# Patient Record
Sex: MALE | Race: WHITE | NOT HISPANIC OR LATINO | Employment: UNEMPLOYED | URBAN - NONMETROPOLITAN AREA
[De-identification: names, ages, dates, MRNs, and addresses within clinical notes are randomized per-mention and may not be internally consistent; named-entity substitution may affect disease eponyms.]

---

## 2021-03-17 ENCOUNTER — OFFICE VISIT (OUTPATIENT)
Dept: CARDIOLOGY | Facility: CLINIC | Age: 56
End: 2021-03-17

## 2021-03-17 VITALS
HEIGHT: 70 IN | SYSTOLIC BLOOD PRESSURE: 160 MMHG | DIASTOLIC BLOOD PRESSURE: 90 MMHG | HEART RATE: 88 BPM | TEMPERATURE: 97.1 F | BODY MASS INDEX: 37.22 KG/M2 | OXYGEN SATURATION: 99 % | WEIGHT: 260 LBS | RESPIRATION RATE: 12 BRPM

## 2021-03-17 DIAGNOSIS — I10 ESSENTIAL HYPERTENSION: ICD-10-CM

## 2021-03-17 DIAGNOSIS — R07.9 CHEST PAIN AT REST: Primary | ICD-10-CM

## 2021-03-17 PROBLEM — R07.89 CHEST DISCOMFORT: Status: RESOLVED | Noted: 2021-03-17 | Resolved: 2021-03-17

## 2021-03-17 PROBLEM — R07.89 CHEST DISCOMFORT: Status: ACTIVE | Noted: 2021-03-17

## 2021-03-17 PROCEDURE — 93000 ELECTROCARDIOGRAM COMPLETE: CPT | Performed by: INTERNAL MEDICINE

## 2021-03-17 PROCEDURE — 99204 OFFICE O/P NEW MOD 45 MIN: CPT | Performed by: INTERNAL MEDICINE

## 2021-03-17 NOTE — PROGRESS NOTES
[facility] [place]  [facility] CARDIOLOGY  [address] [place] [address]  Dept: [phone]  Dept Fax: [phone]    Gracia Brower  1965    New Patient Office Note    History of Present Illness:  Gracia Brower is a 55 y.o. male who presents to the clinic for new patient. For chest discomfort- male 55 years old with no major medical problems  2 months ago was in severe emotional stress per patient, ., moving and having arguments with his father he did have some chest discomfort at resting on and off,  , his father has left and he feels good, , has not had any pain in 2 months, he is smoker for the last  2 years, but he is drinking daily 1 pint, , his BP is 160./80 ., his EKG is unremarkable, he has good functional capacity,, and no complaint, on activities, his cardiac exam is normal except the BP elevated, he thinks can quit drinking , he was advised to eat with low salt, will see him again in 1 month , if he has quit smoking and BP still high, will start him on meds     The following portions of the patient's history were reviewed and updated as appropriate: allergies, current medications, past family history, past medical history, past social history, past surgical history and problem list.    Medications:  This patient does not have an active medication from one of the medication groupers.    Subjective  No Known Allergies     Past Medical History:   Diagnosis Date   • Tonsillitis        Past Surgical History:   Procedure Laterality Date   • TONSILLECTOMY         History reviewed. No pertinent family history.     Social History     Socioeconomic History   • Marital status: Single     Spouse name: Not on file   • Number of children: Not on file   • Years of education: Not on file   • Highest education level: Not on file   Tobacco Use   • Smoking status: Current Some Day Smoker     Packs/day: 0.50     Types: Cigarettes     Start date: 2018   • Smokeless tobacco: Never Used   Substance  "and Sexual Activity   • Alcohol use: Never   • Drug use: Never   • Sexual activity: Defer       Review of Systems   Cardiovascular: Positive for chest pain.   All other systems reviewed and are negative.      Cardiovascular Procedures    ECHO/MUGA:   STRESS TESTS:   CARDIAC CATH:   DEVICES:   HOLTER:   CT/MRI:   VASCULAR:   CARDIOTHORACIC:     Objective  Vitals:    03/17/21 1543   BP: 160/90   BP Location: Left arm   Patient Position: Lying   Cuff Size: Adult   Pulse: 88   Resp: 12   Temp: 97.1 °F (36.2 °C)   TempSrc: Infrared   SpO2: 99%   Weight: 118 kg (260 lb)   Height: 177.8 cm (70\")   PainSc: 0-No pain       Physical Exam  Constitutional:       Appearance: Healthy appearance. Not in distress.   Neck:      Vascular: No JVR. JVD normal.   Pulmonary:      Effort: Pulmonary effort is normal.      Breath sounds: Normal breath sounds. No wheezing. No rhonchi. No rales.   Chest:      Chest wall: Not tender to palpatation.   Cardiovascular:      PMI at left midclavicular line. Normal rate. Regular rhythm. Normal S1. Normal S2.      Murmurs: There is no murmur.      No gallop. No click. No rub.   Pulses:     Intact distal pulses.   Edema:     Peripheral edema absent.   Abdominal:      General: Bowel sounds are normal.      Palpations: Abdomen is soft.      Tenderness: There is no abdominal tenderness.   Musculoskeletal: Normal range of motion.         General: No tenderness. Skin:     General: Skin is warm and dry.   Neurological:      General: No focal deficit present.      Mental Status: Alert and oriented to person, place and time.          Diagnostic Data    ECG 12 Lead    Date/Time: 3/17/2021 4:26 PM  Performed by: Trell Troncoso MD  Authorized by: Trell Troncoso MD   Comparison: compared with previous ECG   Similar to previous ECG  Rhythm: sinus rhythm  Rate: normal  BPM: 99  Conduction: conduction normal    Clinical impression: normal ECG            Assessment and Plan  Diagnoses and all orders " for this visit:    Chest pain at rest- seems likely non cardiac, has not had any chest pain in 2 months    Essential hypertension-The BP is elevated, ,160.80, seems just started drinking last month daily, he states will quit it., also advised to eat with low salt. Will see him back in 1 month with a Lipid         Return in about 1 month (around 4/17/2021) for Recheck.    Trell Troncoso MD  03/17/2021

## 2025-07-08 ENCOUNTER — OFFICE VISIT (OUTPATIENT)
Dept: FAMILY MEDICINE CLINIC | Facility: CLINIC | Age: 60
End: 2025-07-08
Payer: MEDICAID

## 2025-07-08 VITALS
OXYGEN SATURATION: 97 % | BODY MASS INDEX: 32.71 KG/M2 | SYSTOLIC BLOOD PRESSURE: 150 MMHG | WEIGHT: 228.5 LBS | DIASTOLIC BLOOD PRESSURE: 108 MMHG | HEIGHT: 70 IN | HEART RATE: 85 BPM

## 2025-07-08 DIAGNOSIS — Z12.5 SCREENING FOR PROSTATE CANCER: ICD-10-CM

## 2025-07-08 DIAGNOSIS — Z12.11 SCREENING FOR COLON CANCER: ICD-10-CM

## 2025-07-08 DIAGNOSIS — Z00.00 GENERAL MEDICAL EXAM: Primary | ICD-10-CM

## 2025-07-08 DIAGNOSIS — Z13.1 SCREENING FOR DIABETES MELLITUS: ICD-10-CM

## 2025-07-08 DIAGNOSIS — E66.811 CLASS 1 OBESITY DUE TO EXCESS CALORIES WITH SERIOUS COMORBIDITY AND BODY MASS INDEX (BMI) OF 32.0 TO 32.9 IN ADULT: ICD-10-CM

## 2025-07-08 DIAGNOSIS — I10 PRIMARY HYPERTENSION: ICD-10-CM

## 2025-07-08 DIAGNOSIS — F17.200 TOBACCO USE DISORDER: ICD-10-CM

## 2025-07-08 DIAGNOSIS — E66.09 CLASS 1 OBESITY DUE TO EXCESS CALORIES WITH SERIOUS COMORBIDITY AND BODY MASS INDEX (BMI) OF 32.0 TO 32.9 IN ADULT: ICD-10-CM

## 2025-07-08 DIAGNOSIS — Z11.59 NEED FOR HEPATITIS C SCREENING TEST: ICD-10-CM

## 2025-07-08 DIAGNOSIS — Z13.220 SCREENING FOR HYPERLIPIDEMIA: ICD-10-CM

## 2025-07-08 PROBLEM — R07.9 CHEST PAIN AT REST: Status: RESOLVED | Noted: 2021-03-17 | Resolved: 2025-07-08

## 2025-07-08 PROCEDURE — 1160F RVW MEDS BY RX/DR IN RCRD: CPT | Performed by: PHYSICIAN ASSISTANT

## 2025-07-08 PROCEDURE — 99386 PREV VISIT NEW AGE 40-64: CPT | Performed by: PHYSICIAN ASSISTANT

## 2025-07-08 PROCEDURE — 1126F AMNT PAIN NOTED NONE PRSNT: CPT | Performed by: PHYSICIAN ASSISTANT

## 2025-07-08 PROCEDURE — 3077F SYST BP >= 140 MM HG: CPT | Performed by: PHYSICIAN ASSISTANT

## 2025-07-08 PROCEDURE — 2014F MENTAL STATUS ASSESS: CPT | Performed by: PHYSICIAN ASSISTANT

## 2025-07-08 PROCEDURE — 3080F DIAST BP >= 90 MM HG: CPT | Performed by: PHYSICIAN ASSISTANT

## 2025-07-08 PROCEDURE — 1159F MED LIST DOCD IN RCRD: CPT | Performed by: PHYSICIAN ASSISTANT

## 2025-07-08 RX ORDER — TRIAMCINOLONE ACETONIDE 1 MG/G
1 CREAM TOPICAL SEE ADMIN INSTRUCTIONS
COMMUNITY
Start: 2025-06-22

## 2025-07-08 RX ORDER — AMLODIPINE BESYLATE 5 MG/1
5 TABLET ORAL DAILY
Qty: 90 TABLET | Refills: 0 | Status: SHIPPED | OUTPATIENT
Start: 2025-07-08

## 2025-07-08 NOTE — ASSESSMENT & PLAN NOTE
General screening labs, ordered a consult visit for screening colonoscopy.  Patient declines vaccinations.  Discussed healthy diet, exercise and tobacco cessation.

## 2025-07-08 NOTE — ASSESSMENT & PLAN NOTE
Patient's (Body mass index is 32.79 kg/m².) indicates that they are obese (BMI >30) with health conditions that include hypertension . Weight is newly identified. BMI  is above average; BMI management plan is completed. We discussed low calorie, low carb based diet program, portion control, and increasing exercise.

## 2025-07-08 NOTE — PROGRESS NOTES
"     Annual Physical-Preventive Visit     Patient Name: Gracia Brower  : 1965   MRN: [id number]     Chief Complaint:    Chief Complaint   Patient presents with   • Establish Care   • Annual Exam       History of Present Illness: Gracia Brower is a 59 y.o. male who is here today for their annual health maintenance and physical.  He needs to establish care.  He primarily scheduled the appointment to be referred for screening colonoscopy.  He says he is under a lot of stress with a new job, a \"crazy\" roommate.  He says he likes to eat Mexican food and drink beer.  Patient has not seen a primary care provider in several years.  He cannot recall the last time he had any screening blood work.    Subjective      Review of Systems:   Review of Systems   Constitutional:  Negative for fatigue and fever.   HENT:  Negative for hearing loss.    Eyes:  Negative for visual disturbance.   Respiratory:  Negative for shortness of breath.    Cardiovascular:  Negative for chest pain, palpitations and leg swelling.   Gastrointestinal:  Negative for abdominal pain, blood in stool, constipation and diarrhea.   Genitourinary:  Negative for difficulty urinating.   Musculoskeletal:  Negative for arthralgias and myalgias.   Skin:  Positive for rash.   Allergic/Immunologic: Negative for immunocompromised state.   Psychiatric/Behavioral:  Negative for dysphoric mood. The patient is not nervous/anxious.         Past History:  Medical History: has a past medical history of Eczema and Tonsillitis.   Surgical History: has a past surgical history that includes Tonsillectomy.   Family History: family history includes No Known Problems in his father and mother.   Social History: reports that he quit smoking about 6 months ago. His smoking use included cigarettes. He started smoking about 7 years ago. He has a 3.5 pack-year smoking history. He has been exposed to tobacco smoke. He has never used smokeless tobacco. He reports current alcohol use of " "about 14.0 standard drinks of alcohol per week. He reports that he does not use drugs.    Health Maintenance   Topic Date Due   • COLORECTAL CANCER SCREENING  Never done   • HEPATITIS C SCREENING  Never done   • ANNUAL PHYSICAL  Never done   • COVID-19 Vaccine (1 - 2024-25 season) 07/22/2025 (Originally 9/1/2024)   • ZOSTER VACCINE (1 of 2) 07/22/2025 (Originally 10/17/2014)   • Pneumococcal Vaccine 50+ (1 of 1 - PCV) 07/08/2026 (Originally 10/17/2014)   • TDAP/TD VACCINES (1 - Tdap) 07/08/2026 (Originally 10/17/1983)   • INFLUENZA VACCINE  10/01/2025        Immunization History   Administered Date(s) Administered   • DTaP 5 03/31/2023       Medications:     Current Outpatient Medications:   •  triamcinolone (KENALOG) 0.1 % cream, Apply 1 Application topically to the appropriate area as directed See Admin Instructions. Apply topically to the affected area twice daily, Disp: , Rfl:   •  amLODIPine (NORVASC) 5 MG tablet, Take 1 tablet by mouth Daily., Disp: 90 tablet, Rfl: 0    Allergies:   No Known Allergies    Depression: PHQ-2 Depression Screening  Little interest or pleasure in doing things?     Feeling down, depressed, or hopeless?     PHQ-2 Total Score        Predictive Model Details   No score data available for Risk of Fall         Objective     Physical Exam:  Vital Signs:   Vitals:    07/08/25 1028 07/08/25 1117   BP: (!) 156/118 (!) 150/108   BP Location: Left arm Right arm   Patient Position: Sitting Sitting   Cuff Size: Adult Adult   Pulse: 85    SpO2: 97%    Weight: 104 kg (228 lb 8 oz)    Height: 177.8 cm (70\")    PainSc: 0-No pain      Body mass index is 32.79 kg/m².   BMI is >= 30 and <35. (Class 1 Obesity). The following options were offered after discussion;: weight loss educational material (shared in after visit summary)       Physical Exam  Constitutional:       General: He is not in acute distress.     Appearance: Normal appearance.   HENT:      Head: Normocephalic and atraumatic.      Right " Ear: Tympanic membrane, ear canal and external ear normal.      Left Ear: Tympanic membrane, ear canal and external ear normal.      Nose: Nose normal.      Mouth/Throat:      Mouth: Mucous membranes are moist.      Pharynx: Oropharynx is clear.   Eyes:      Extraocular Movements: Extraocular movements intact.      Conjunctiva/sclera: Conjunctivae normal.      Pupils: Pupils are equal, round, and reactive to light.   Cardiovascular:      Rate and Rhythm: Normal rate and regular rhythm.   Pulmonary:      Effort: Pulmonary effort is normal.      Breath sounds: Normal breath sounds.   Abdominal:      General: Bowel sounds are normal.      Palpations: Abdomen is soft.      Tenderness: There is no abdominal tenderness.   Musculoskeletal:      Cervical back: Normal range of motion and neck supple.   Skin:     General: Skin is warm and dry.      Comments: Scattered eczematous rash.   Neurological:      Mental Status: He is alert and oriented to person, place, and time. Mental status is at baseline.   Psychiatric:         Mood and Affect: Mood normal.         Behavior: Behavior normal.         Thought Content: Thought content normal.         Judgment: Judgment normal.         Procedures    Assessment / Plan      Assessment/Plan:   Diagnoses and all orders for this visit:    1. General medical exam (Primary)  Assessment & Plan:  General screening labs, ordered a consult visit for screening colonoscopy.  Patient declines vaccinations.  Discussed healthy diet, exercise and tobacco cessation.    Orders:  -     Comprehensive Metabolic Panel  -     Vitamin B12  -     Folate  -     Lipid Panel  -     Hemoglobin A1c  -     TSH  -     T4, Free  -     CBC Auto Differential  -     CK  -     HCV Antibody Rfx To Qnt PCR  -     PSA Screen  -     Vitamin D,25-Hydroxy  -     Magnesium  -     Uric Acid    2. Tobacco use disorder  Assessment & Plan:  Stop tobacco use to decrease CV risk.       3. Primary hypertension  Assessment &  Plan:  Hypertension is uncontrolled  Weight loss.  Regular aerobic exercise.  Stop oral tobacco use. Start amlodipine 5 mg daily.   Blood pressure will be reassessedin 4 weeks.    Orders:  -     amLODIPine (NORVASC) 5 MG tablet; Take 1 tablet by mouth Daily.  Dispense: 90 tablet; Refill: 0    4. Class 1 obesity due to excess calories with serious comorbidity and body mass index (BMI) of 32.0 to 32.9 in adult  Assessment & Plan:  Patient's (Body mass index is 32.79 kg/m².) indicates that they are obese (BMI >30) with health conditions that include hypertension . Weight is newly identified. BMI  is above average; BMI management plan is completed. We discussed low calorie, low carb based diet program, portion control, and increasing exercise.     Orders:  -     Vitamin D,25-Hydroxy  -     Uric Acid    5. Screening for hyperlipidemia  -     Lipid Panel    6. Screening for diabetes mellitus  -     Hemoglobin A1c    7. Need for hepatitis C screening test  -     HCV Antibody Rfx To Qnt PCR    8. Screening for prostate cancer  -     PSA Screen    9. Screening for colon cancer  -     Ambulatory Referral For Screening Colonoscopy             Current Outpatient Medications:   •  triamcinolone (KENALOG) 0.1 % cream, Apply 1 Application topically to the appropriate area as directed See Admin Instructions. Apply topically to the affected area twice daily, Disp: , Rfl:   •  amLODIPine (NORVASC) 5 MG tablet, Take 1 tablet by mouth Daily., Disp: 90 tablet, Rfl: 0    Follow Up:   Return in about 2 months (around 9/8/2025) for Recheck.    Healthcare Maintenance:   Counseling provided on healthy diet and exercise.  Gracia Brower voices understanding and acceptance of this advice.  AVS with preventive healthcare tips printed for patient.     Taylor Burger PA-C  [facility]       NOTE TO PATIENT: The 21st Century Cures Act makes medical notes like these available to patients in the interest of transparency. However,  be advised this is a medical document. It is intended as peer to peer communication. It is written in medical language and may contain abbreviations or verbiage that are unfamiliar. It may appear blunt or direct. Medical documents are intended to carry relevant information, facts as evident, and the clinical opinion of the practitioner.

## 2025-07-08 NOTE — ASSESSMENT & PLAN NOTE
Hypertension is uncontrolled  Weight loss.  Regular aerobic exercise.  Stop oral tobacco use. Start amlodipine 5 mg daily.   Blood pressure will be reassessedin 4 weeks.

## 2025-07-09 LAB
25(OH)D3+25(OH)D2 SERPL-MCNC: 13.5 NG/ML (ref 30–100)
ALBUMIN SERPL-MCNC: 4.2 G/DL (ref 3.8–4.9)
ALP SERPL-CCNC: 81 IU/L (ref 44–121)
ALT SERPL-CCNC: 19 IU/L (ref 0–44)
AST SERPL-CCNC: 21 IU/L (ref 0–40)
BASOPHILS # BLD AUTO: 0.1 X10E3/UL (ref 0–0.2)
BASOPHILS NFR BLD AUTO: 1 %
BILIRUB SERPL-MCNC: 0.2 MG/DL (ref 0–1.2)
BUN SERPL-MCNC: 16 MG/DL (ref 8–27)
BUN/CREAT SERPL: 13 (ref 10–24)
CALCIUM SERPL-MCNC: 9.3 MG/DL (ref 8.6–10.2)
CHLORIDE SERPL-SCNC: 104 MMOL/L (ref 96–106)
CHOLEST SERPL-MCNC: 209 MG/DL (ref 100–199)
CK SERPL-CCNC: 178 U/L (ref 41–331)
CO2 SERPL-SCNC: 22 MMOL/L (ref 20–29)
CREAT SERPL-MCNC: 1.22 MG/DL (ref 0.76–1.27)
EGFRCR SERPLBLD CKD-EPI 2021: 68 ML/MIN/1.73
EOSINOPHIL # BLD AUTO: 0.6 X10E3/UL (ref 0–0.4)
EOSINOPHIL NFR BLD AUTO: 8 %
ERYTHROCYTE [DISTWIDTH] IN BLOOD BY AUTOMATED COUNT: 16.5 % (ref 11.6–15.4)
FOLATE SERPL-MCNC: 7.8 NG/ML
GLOBULIN SER CALC-MCNC: 2.4 G/DL (ref 1.5–4.5)
GLUCOSE SERPL-MCNC: 104 MG/DL (ref 70–99)
HBA1C MFR BLD: 5.6 % (ref 4.8–5.6)
HCT VFR BLD AUTO: 44.7 % (ref 37.5–51)
HCV AB SERPL QL IA: NON REACTIVE
HCV AB SERPL QL IA: NORMAL
HDLC SERPL-MCNC: 76 MG/DL
HGB BLD-MCNC: 13.6 G/DL (ref 13–17.7)
IMM GRANULOCYTES # BLD AUTO: 0.1 X10E3/UL (ref 0–0.1)
IMM GRANULOCYTES NFR BLD AUTO: 1 %
LDLC SERPL CALC-MCNC: 112 MG/DL (ref 0–99)
LYMPHOCYTES # BLD AUTO: 1.6 X10E3/UL (ref 0.7–3.1)
LYMPHOCYTES NFR BLD AUTO: 22 %
MAGNESIUM SERPL-MCNC: 2.2 MG/DL (ref 1.6–2.3)
MCH RBC QN AUTO: 28.6 PG (ref 26.6–33)
MCHC RBC AUTO-ENTMCNC: 30.4 G/DL (ref 31.5–35.7)
MCV RBC AUTO: 94 FL (ref 79–97)
MONOCYTES # BLD AUTO: 0.8 X10E3/UL (ref 0.1–0.9)
MONOCYTES NFR BLD AUTO: 12 %
NEUTROPHILS # BLD AUTO: 4.2 X10E3/UL (ref 1.4–7)
NEUTROPHILS NFR BLD AUTO: 56 %
PLATELET # BLD AUTO: 320 X10E3/UL (ref 150–450)
POTASSIUM SERPL-SCNC: 4.6 MMOL/L (ref 3.5–5.2)
PROT SERPL-MCNC: 6.6 G/DL (ref 6–8.5)
PSA SERPL-MCNC: 1.2 NG/ML (ref 0–4)
RBC # BLD AUTO: 4.76 X10E6/UL (ref 4.14–5.8)
SODIUM SERPL-SCNC: 143 MMOL/L (ref 134–144)
T4 FREE SERPL-MCNC: 1.07 NG/DL (ref 0.82–1.77)
TRIGL SERPL-MCNC: 120 MG/DL (ref 0–149)
TSH SERPL DL<=0.005 MIU/L-ACNC: 1.02 UIU/ML (ref 0.45–4.5)
URATE SERPL-MCNC: 6 MG/DL (ref 3.8–8.4)
VIT B12 SERPL-MCNC: 305 PG/ML (ref 232–1245)
VLDLC SERPL CALC-MCNC: 21 MG/DL (ref 5–40)
WBC # BLD AUTO: 7.3 X10E3/UL (ref 3.4–10.8)

## 2025-07-31 ENCOUNTER — TELEPHONE (OUTPATIENT)
Dept: FAMILY MEDICINE CLINIC | Facility: CLINIC | Age: 60
End: 2025-07-31
Payer: MEDICAID